# Patient Record
Sex: FEMALE | HISPANIC OR LATINO | URBAN - METROPOLITAN AREA
[De-identification: names, ages, dates, MRNs, and addresses within clinical notes are randomized per-mention and may not be internally consistent; named-entity substitution may affect disease eponyms.]

---

## 2022-09-07 ENCOUNTER — APPOINTMENT (OUTPATIENT)
Dept: URBAN - METROPOLITAN AREA CLINIC 207 | Age: 32
Setting detail: DERMATOLOGY
End: 2022-09-13

## 2022-09-07 DIAGNOSIS — L81.0 POSTINFLAMMATORY HYPERPIGMENTATION: ICD-10-CM

## 2022-09-07 DIAGNOSIS — Z71.89 OTHER SPECIFIED COUNSELING: ICD-10-CM

## 2022-09-07 DIAGNOSIS — L81.1 CHLOASMA: ICD-10-CM

## 2022-09-07 PROCEDURE — OTHER SUNSCREEN RECOMMENDATIONS: OTHER

## 2022-09-07 PROCEDURE — OTHER PRESCRIPTION: OTHER

## 2022-09-07 PROCEDURE — 99204 OFFICE O/P NEW MOD 45 MIN: CPT

## 2022-09-07 PROCEDURE — OTHER COUNSELING: OTHER

## 2022-09-07 PROCEDURE — OTHER PRESCRIPTION MEDICATION MANAGEMENT: OTHER

## 2022-09-07 RX ORDER — FLUOCINOLONE ACETONIDE, HYDROQUINONE, AND TRETINOIN .1; 40; .5 MG/G; MG/G; MG/G
CREAM TOPICAL QHS
Qty: 30 | Refills: 2 | Status: ERX | COMMUNITY
Start: 2022-09-07

## 2022-09-07 ASSESSMENT — LOCATION DETAILED DESCRIPTION DERM
LOCATION DETAILED: LEFT INFERIOR CENTRAL MALAR CHEEK
LOCATION DETAILED: RIGHT INFERIOR CENTRAL MALAR CHEEK

## 2022-09-07 ASSESSMENT — LOCATION ZONE DERM: LOCATION ZONE: FACE

## 2022-09-07 ASSESSMENT — LOCATION SIMPLE DESCRIPTION DERM
LOCATION SIMPLE: RIGHT CHEEK
LOCATION SIMPLE: LEFT CHEEK

## 2022-09-07 NOTE — PROCEDURE: PRESCRIPTION MEDICATION MANAGEMENT
Initiate Treatment: Rx: Tri-lorena cream - apply as spot treatment to dark areas on face nightly for 2 months, then discontinue for 2 months; repeat\\n\\nReviewed the importance of sunscreen SPF 30+ daily and reapplying every 1-2 hours when outdoors. Recommended wide brimmed hats.\\nRecommended consulting with an  in our [place] office to discuss laser/chemical peels\\nDiscontinue if become pregnant Initiate Treatment: Rx: Tri-lorena cream - apply as spot treatment to dark areas on face nightly for 2 months, then discontinue for 2 months; repeat\\n\\nReviewed the importance of sunscreen SPF 30+ daily and reapplying every 1-2 hours when outdoors. Recommended wide brimmed hats.\\nRecommended consulting with an  in our [place] office to discuss laser/chemical peels\\nDiscontinue if become pregnant

## 2022-09-07 NOTE — PROCEDURE: SUNSCREEN RECOMMENDATIONS

## 2022-09-07 NOTE — HPI: DISCOLORATION
How Severe Is Your Skin Discoloration?: mild
Additional History: Patient has had facials in the past and began using eldoquin cream to treat lesions